# Patient Record
Sex: MALE | URBAN - METROPOLITAN AREA
[De-identification: names, ages, dates, MRNs, and addresses within clinical notes are randomized per-mention and may not be internally consistent; named-entity substitution may affect disease eponyms.]

---

## 2020-03-01 ENCOUNTER — EMERGENCY (EMERGENCY)
Facility: HOSPITAL | Age: 30
LOS: 1 days | Discharge: HOME | End: 2020-03-01
Admitting: EMERGENCY MEDICINE
Payer: MEDICAID

## 2020-03-01 VITALS
WEIGHT: 179.9 LBS | HEART RATE: 57 BPM | OXYGEN SATURATION: 100 % | DIASTOLIC BLOOD PRESSURE: 79 MMHG | SYSTOLIC BLOOD PRESSURE: 122 MMHG | RESPIRATION RATE: 18 BRPM | HEIGHT: 67 IN | TEMPERATURE: 98 F

## 2020-03-01 PROCEDURE — 99283 EMERGENCY DEPT VISIT LOW MDM: CPT | Mod: 25

## 2020-03-01 PROCEDURE — 12001 RPR S/N/AX/GEN/TRNK 2.5CM/<: CPT

## 2020-03-01 RX ORDER — TETANUS TOXOID, REDUCED DIPHTHERIA TOXOID AND ACELLULAR PERTUSSIS VACCINE, ADSORBED 5; 2.5; 8; 8; 2.5 [IU]/.5ML; [IU]/.5ML; UG/.5ML; UG/.5ML; UG/.5ML
0.5 SUSPENSION INTRAMUSCULAR ONCE
Refills: 0 | Status: COMPLETED | OUTPATIENT
Start: 2020-03-01 | End: 2020-03-01

## 2020-03-01 RX ADMIN — TETANUS TOXOID, REDUCED DIPHTHERIA TOXOID AND ACELLULAR PERTUSSIS VACCINE, ADSORBED 0.5 MILLILITER(S): 5; 2.5; 8; 8; 2.5 SUSPENSION INTRAMUSCULAR at 15:43

## 2020-03-01 NOTE — ED ADULT NURSE NOTE - NSIMPLEMENTINTERV_GEN_ALL_ED
Implemented All Universal Safety Interventions:  [place] to call system. Call bell, personal items and telephone within reach. Instruct patient to call for assistance. Room bathroom lighting operational. Non-slip footwear when patient is off stretcher. Physically safe environment: no spills, clutter or unnecessary equipment. Stretcher in lowest position, wheels locked, appropriate side rails in place.

## 2020-03-01 NOTE — ED PROVIDER NOTE - OBJECTIVE STATEMENT
Patient presents with laceration to the R 4th finger. states that he was working cleaning a knife when he cut his finger. denies paresthesias or focal weakness. last tetanus unknown

## 2020-03-01 NOTE — ED ADULT NURSE NOTE - CHPI ED NUR SYMPTOMS NEG
no vomiting/no purulent drainage/no rectal pain/no redness/no drainage/no chills/no fever/no blood in mucus

## 2020-03-01 NOTE — ED PROVIDER NOTE - PATIENT PORTAL LINK FT
You can access the FollowMyHealth Patient Portal offered by [facility] by registering at the following website: [email or web address]. By joining [facility]’s FollowMyHealth portal, you will also be able to view your health information using other applications (apps) compatible with our system.

## 2020-03-01 NOTE — ED ADULT NURSE NOTE - OBJECTIVE STATEMENT
30 yo M c/o laceration to R ring finger. Pt states "I was cleaning the knives at work and my hand slipped and cut my R ring finger." Noted laceration to finger. Pt unsure of last tetanus. Denies blood thinner use, states "I only take something for acid reflux." Denies headache, dizziness, numbness/tingling, CP, SOB, fever/chills, N/V/D. Pt speaking in full complete sentences, ambulatory with steady gait.

## 2020-03-01 NOTE — ED PROVIDER NOTE - CLINICAL SUMMARY MEDICAL DECISION MAKING FREE TEXT BOX
Patient with uncomplicated finger laceration. tetanus up dated. advised return in one week for suture removal
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
## 2020-03-06 DIAGNOSIS — Z23 ENCOUNTER FOR IMMUNIZATION: ICD-10-CM
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
## 2020-03-06 DIAGNOSIS — S61.214A LACERATION WITHOUT FOREIGN BODY OF RIGHT RING FINGER WITHOUT DAMAGE TO NAIL, INITIAL ENCOUNTER: ICD-10-CM
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
## 2020-04-01 ENCOUNTER — OUTPATIENT (OUTPATIENT)
Dept: OUTPATIENT SERVICES | Facility: HOSPITAL | Age: 30
LOS: 1 days | End: 2020-04-01
Payer: MEDICAID

## 2020-04-01 PROCEDURE — G9001: CPT

## 2020-04-02 DIAGNOSIS — Z71.89 OTHER SPECIFIED COUNSELING: ICD-10-CM

## 2020-04-03 PROBLEM — Z78.9 OTHER SPECIFIED HEALTH STATUS: Chronic | Status: ACTIVE | Noted: 2020-03-01

## 2021-09-11 NOTE — ED ADULT NURSE NOTE - NEURO SENSATION
Problem: Postoperative Care  Goal: Vital signs are maintained within parameters  Outcome: Outcome Met, Continue evaluating goal progress toward completion  Flowsheets (Taken 9/10/2021 1529 by Rasheeda Garcia CNA)  BP: 137/61  Pulse: 60  Temp: 97.1 °F (36.2 °C)  Resp: 16  SpO2: 95 %  Note: Weaning off oxygen encouraging IS and ambulating  Goal: Elimination status is maintained/returned to baseline  Outcome: Outcome Not Met, Continue to Monitor  Flowsheets (Taken 9/10/2021 2015)  GI Symptoms: Decreased appetite  Alterations in Voiding: Incontinence-type unknown  Note: Patient denies passing flatus  Goal: Oral intake is resumed and tolerated  Outcome: Outcome Met, Continue evaluating goal progress toward completion  Flowsheets (Taken 9/10/2021 2015)  GI Symptoms: Decreased appetite  Note: Patient tolerating clear liquids.   Goal: Activity level is resumed to level needed for d/c  Outcome: Outcome Met, Continue evaluating goal progress toward completion  Note: Patient up SBA with ZAINA and WW      sensory intact

## 2024-04-15 NOTE — ED PROVIDER NOTE - SKIN TURGOR
Chief Complaint   Patient presents with    Wound Check     Pt presents to ed with c/o having lump under his top lip        resilient/elastic